# Patient Record
Sex: FEMALE | Race: WHITE | Employment: OTHER | URBAN - METROPOLITAN AREA
[De-identification: names, ages, dates, MRNs, and addresses within clinical notes are randomized per-mention and may not be internally consistent; named-entity substitution may affect disease eponyms.]

---

## 2020-12-28 ENCOUNTER — HOSPITAL ENCOUNTER (OUTPATIENT)
Age: 85
Discharge: HOME | End: 2020-12-28
Payer: MEDICARE

## 2020-12-28 VITALS
BODY MASS INDEX: 23.49 KG/M2 | WEIGHT: 155 LBS | OXYGEN SATURATION: 100 % | DIASTOLIC BLOOD PRESSURE: 82 MMHG | HEART RATE: 65 BPM | SYSTOLIC BLOOD PRESSURE: 161 MMHG | HEIGHT: 68 IN | RESPIRATION RATE: 20 BRPM | TEMPERATURE: 98 F

## 2020-12-28 DIAGNOSIS — Z20.822 ENCOUNTER FOR SCREENING LABORATORY TESTING FOR COVID-19 VIRUS: Primary | ICD-10-CM

## 2020-12-28 PROCEDURE — 99203 OFFICE O/P NEW LOW 30 MIN: CPT

## 2020-12-28 NOTE — ED INITIAL ASSESSMENT (HPI)
Son reports patient is getting ready to go into an assisted care, and had a rapid covid test that was positive. Son reports he does not believe the test is positive because the patient does not have any symptoms.   Patient is requesting a covid test.

## 2020-12-28 NOTE — ED PROVIDER NOTES
Patient Seen in: [facility]      History   Patient presents with:  Testing    Stated Complaint: covid test     HPI  Positive rapid test for Covid earlier today.   Patient's PCP does not believe that this is a true result and is requesting Effort: Pulmonary effort is normal.      Breath sounds: Normal breath sounds. Abdominal:      General: Bowel sounds are normal. There is no distension. Palpations: Abdomen is soft. Tenderness: There is no abdominal tenderness.    Lymphadenopathy